# Patient Record
Sex: FEMALE | Race: OTHER
[De-identification: names, ages, dates, MRNs, and addresses within clinical notes are randomized per-mention and may not be internally consistent; named-entity substitution may affect disease eponyms.]

---

## 2021-09-28 ENCOUNTER — RESULT REVIEW (OUTPATIENT)
Age: 71
End: 2021-09-28

## 2022-06-22 PROBLEM — Z00.00 ENCOUNTER FOR PREVENTIVE HEALTH EXAMINATION: Status: ACTIVE | Noted: 2022-06-22

## 2022-06-23 ENCOUNTER — APPOINTMENT (OUTPATIENT)
Dept: ORTHOPEDIC SURGERY | Facility: CLINIC | Age: 72
End: 2022-06-23

## 2022-06-30 ENCOUNTER — APPOINTMENT (OUTPATIENT)
Dept: ORTHOPEDIC SURGERY | Facility: CLINIC | Age: 72
End: 2022-06-30

## 2022-06-30 DIAGNOSIS — Z86.39 PERSONAL HISTORY OF OTHER ENDOCRINE, NUTRITIONAL AND METABOLIC DISEASE: ICD-10-CM

## 2022-06-30 DIAGNOSIS — Z86.79 PERSONAL HISTORY OF OTHER DISEASES OF THE CIRCULATORY SYSTEM: ICD-10-CM

## 2022-10-13 ENCOUNTER — APPOINTMENT (OUTPATIENT)
Dept: ORTHOPEDIC SURGERY | Facility: CLINIC | Age: 72
End: 2022-10-13

## 2022-10-13 PROCEDURE — J3490M: CUSTOM

## 2022-10-13 PROCEDURE — 99214 OFFICE O/P EST MOD 30 MIN: CPT | Mod: 25

## 2022-10-13 PROCEDURE — 73564 X-RAY EXAM KNEE 4 OR MORE: CPT | Mod: 50

## 2022-10-13 PROCEDURE — 20610 DRAIN/INJ JOINT/BURSA W/O US: CPT | Mod: 50

## 2022-10-13 NOTE — HISTORY OF PRESENT ILLNESS
[de-identified] : 72F(retired)Bilateral knee pain since 2014 when pt. suffered a fall R>L. pain located at the anterior aspect of bilateral\par knees with associated stiffness and swelling. worse with prolonged standing, better at rest. has tried icing, moist heat\par and HEP, Advil and Tylenol with some relief.\par \par 10/13/22- Had CSI (12/2), no improvement

## 2022-10-13 NOTE — ASSESSMENT
[FreeTextEntry1] : Bilateral  X-Ray Examination of the KNEE (4 views): severe medial and patellofemoral degenerate changes.\par \par - We discussed their diagnosis and treatment options at length including the risks and benefits of both surgical and non-surgical options.\par - We will continue conservative treatment with activity modification, PT, icing, weight loss, and anti-inflammatory medications.\par - The patient was provided with a PT prescription to work on ROM, hip ER/abductors strengthening, quad/hamstring stretches and strengthening, and other exercises \par - The patient was advised to let pain guide the gradual advancement of activities. \par - We also discussed the possible of a corticosteroid injection in order to help decrease inflammation and pain so that they can perform better therapy.\par - The risks, benefits, and alternatives to corticosteroid injection were reviewed with the patient and they wished to proceed with this treatment course. \par - Follow up as needed in 6 weeks to re-evaluate, if no improvement we spoke about possibility of viscosupplementation injections\par \par (may eventually need TKA)\par

## 2023-03-30 ENCOUNTER — APPOINTMENT (OUTPATIENT)
Dept: ORTHOPEDIC SURGERY | Facility: CLINIC | Age: 73
End: 2023-03-30
Payer: MEDICARE

## 2023-03-30 VITALS — WEIGHT: 162 LBS | BODY MASS INDEX: 24.55 KG/M2 | HEIGHT: 68 IN

## 2023-03-30 PROCEDURE — 20610 DRAIN/INJ JOINT/BURSA W/O US: CPT | Mod: 50

## 2023-03-30 PROCEDURE — 99214 OFFICE O/P EST MOD 30 MIN: CPT | Mod: 25

## 2023-03-30 PROCEDURE — J3490M: CUSTOM | Mod: NC

## 2023-03-30 NOTE — HISTORY OF PRESENT ILLNESS
[de-identified] : 72F (retired)Bilateral knee pain since 2014 when pt. suffered a fall R>L. pain located at the anterior aspect of bilateral knees with associated stiffness and swelling. worse with prolonged standing, better at rest. has tried icing, moist heat and HEP, Advil and Tylenol with some relief.\par \par 10/13/22- Had CSI (12/2), no improvement \par 3/30/23- had CSI (10/13) with relief. Doing HEP pain returns\par

## 2023-03-30 NOTE — ASSESSMENT
[FreeTextEntry1] :  severe medial and patellofemoral degenerate changes.\par \par - We discussed their diagnosis and treatment options at length including the risks and benefits of both surgical and non-surgical options.\par - We will continue conservative treatment with activity modification, PT, icing, weight loss, and anti-inflammatory medications.\par - The patient was provided with a PT prescription to work on ROM, hip ER/abductors strengthening, quad/hamstring stretches and strengthening, and other exercises \par - The patient was advised to let pain guide the gradual advancement of activities. \par - We also discussed the possible of a corticosteroid injection in order to help decrease inflammation and pain so that they can perform better therapy.\par - The risks, benefits, and alternatives to corticosteroid injection were reviewed with the patient and they wished to proceed with this treatment course. \par - Follow up as needed in 6 weeks to re-evaluate, if no improvement we spoke about possibility of viscosupplementation injections\par \par (may eventually need TKA)\par

## 2023-03-30 NOTE — IMAGING
[de-identified] : \par LEFT KNEE\par Inspection:  mild effusion\par Palpation: medial joint line tenderness, anterior tenderness\par Knee Range of Motion:  3-125 \par Strength: 5/5 Quadriceps strength, 5/5 Hamstring strength\par Neurological: light touch is intact throughout\par Ligament Stability and Special Tests: \par McMurrays: neg\par Lachman: neg\par Pivot Shift: neg\par Posterior Drawer: neg\par Valgus: neg\par Varus: neg\par Patella Apprehension: neg\par Patella Maltracking: neg\par \par \par RIGHT KNEE\par Inspection:  mild effusion\par Palpation: medial joint line tenderness, anterior tenderness\par Knee Range of Motion:  3-125 \par Strength: 5/5 Quadriceps strength, 5/5 Hamstring strength\par Neurological: light touch is intact throughout\par Ligament Stability and Special Tests: \par McMurrays: neg\par Lachman: neg\par Pivot Shift: neg\par Posterior Drawer: neg\par Valgus: neg\par Varus: neg\par Patella Apprehension: neg\par Patella Maltracking: neg\par \par

## 2023-06-08 ENCOUNTER — APPOINTMENT (OUTPATIENT)
Dept: ORTHOPEDIC SURGERY | Facility: CLINIC | Age: 73
End: 2023-06-08
Payer: MEDICARE

## 2023-06-08 VITALS — HEIGHT: 67.5 IN | WEIGHT: 163 LBS | BODY MASS INDEX: 25.29 KG/M2

## 2023-06-08 PROCEDURE — 20610 DRAIN/INJ JOINT/BURSA W/O US: CPT | Mod: RT

## 2023-06-08 PROCEDURE — 99214 OFFICE O/P EST MOD 30 MIN: CPT | Mod: 25

## 2023-06-08 RX ORDER — METHYLPREDNISOLONE 4 MG/1
4 TABLET ORAL
Qty: 1 | Refills: 0 | Status: ACTIVE | COMMUNITY
Start: 2023-06-08 | End: 1900-01-01

## 2023-06-08 NOTE — PROCEDURE
[FreeTextEntry3] : Aspiration Procedure Note:\par \par The risks, benefits, and alternatives to aspiration were reviewed with the patient.  Risks outlined include but are not limited to infection, sepsis, bleeding, temporary increase in pain, syncopal episode, failure to resolve symptoms, and symptoms recurrence. Patient understood the risks and asked to proceed with this treatment course.\par \par Patient Identification\par Name/: Verbal with patient and/or family\par \par Procedure Verification:\par Procedure confirmed with patient or family/designee\par Consent for procedure: Verbal Consent Given\par Relevant documentation completed, reviewed, and signed\par Clinical indications for procedure confirmed\par \par Time-out with all members of procedure team immediately prior to procedure:\par Correct patient identified. Agreement on procedure. Correct side and site.\par \par KNEE ASPIRATION - RIGHT\par After verbal consent and identification of the correct patient and correct site, the superolateral right knee was prepped using alcohol swabs and betadine. This was allowed time to air dry. \par 64 cc of yellow fluid was aspirated from the knee using a sterile 18G needle after ethyl chloride spray for skin anesthesia. The patient tolerated the procedure well. After-care instructions were provided and included instructions to ice the area and to call if redness, pain, or fever develop.\par

## 2023-06-08 NOTE — ASSESSMENT
[FreeTextEntry1] :  severe medial and patellofemoral degenerate changes.\par \par - We discussed their diagnosis and treatment options at length including the risks and benefits of both surgical and non-surgical options.\par - We will continue conservative treatment with activity modification, PT, icing, weight loss, and anti-inflammatory medications.\par - The patient was provided with a PT prescription to work on ROM, hip ER/abductors strengthening, quad/hamstring stretches and strengthening, and other exercises \par - The patient was advised to let pain guide the gradual advancement of activities. \par - MDP rx\par - Discussed the possible side effects of medication along with the timing and frequency for taking.\par - Right knee asp today - cooper well\par - Follow up as needed in 6 weeks to re-evaluate, if no improvement we spoke about possibility of viscosupplementation injections\par \par (may eventually need TKA)\par

## 2023-06-08 NOTE — HISTORY OF PRESENT ILLNESS
[de-identified] : 73F (retired)Bilateral knee pain since 2014 when pt. suffered a fall R>L. pain located at the anterior aspect of bilateral knees with associated stiffness and swelling. worse with prolonged standing, better at rest. has tried icing, moist heat and HEP, Advil and Tylenol, advil with some relief.\par \par 10/13/22 - Had CSI (12/2), no improvement \par 3/30/23 - had CSI (10/13) with relief. Doing HEP pain returns\par 6/8/23 - had CSI (3/30) with relief in both knees but now right worsening since twisted while shopping [email or web address]

## 2023-06-08 NOTE — IMAGING
[de-identified] : \par RIGHT KNEE\par Inspection:  mild effusion, pop cyst\par Palpation: medial joint line tenderness, anterior tenderness\par Knee Range of Motion:  3-125 \par Strength: 5/5 Quadriceps strength, 5/5 Hamstring strength\par Neurological: light touch is intact throughout\par Ligament Stability and Special Tests: \par McMurrays: neg\par Lachman: neg\par Pivot Shift: neg\par Posterior Drawer: neg\par Valgus: neg\par Varus: neg\par Patella Apprehension: neg\par Patella Maltracking: neg\par \par \par LEFT KNEE\par Inspection:  mild effusion\par Palpation: medial joint line tenderness, anterior tenderness\par Knee Range of Motion:  3-125 \par Strength: 5/5 Quadriceps strength, 5/5 Hamstring strength\par Neurological: light touch is intact throughout\par Ligament Stability and Special Tests: \par McMurrays: neg\par Lachman: neg\par Pivot Shift: neg\par Posterior Drawer: neg\par Valgus: neg\par Varus: neg\par Patella Apprehension: neg\par Patella Maltracking: neg\par \par \par \par

## 2023-09-28 ENCOUNTER — APPOINTMENT (OUTPATIENT)
Dept: ORTHOPEDIC SURGERY | Facility: CLINIC | Age: 73
End: 2023-09-28
Payer: MEDICARE

## 2023-09-28 VITALS — HEIGHT: 67.5 IN | WEIGHT: 163 LBS | BODY MASS INDEX: 25.29 KG/M2

## 2023-09-28 PROCEDURE — 20610 DRAIN/INJ JOINT/BURSA W/O US: CPT | Mod: 50

## 2023-09-28 PROCEDURE — J3490M: CUSTOM | Mod: NC

## 2023-09-28 PROCEDURE — 99214 OFFICE O/P EST MOD 30 MIN: CPT | Mod: 25

## 2023-10-12 ENCOUNTER — APPOINTMENT (OUTPATIENT)
Dept: ORTHOPEDIC SURGERY | Facility: CLINIC | Age: 73
End: 2023-10-12
Payer: MEDICARE

## 2023-10-12 PROCEDURE — 99214 OFFICE O/P EST MOD 30 MIN: CPT | Mod: 25

## 2023-10-12 PROCEDURE — 20610 DRAIN/INJ JOINT/BURSA W/O US: CPT | Mod: 50

## 2023-12-21 ENCOUNTER — APPOINTMENT (OUTPATIENT)
Dept: ORTHOPEDIC SURGERY | Facility: CLINIC | Age: 73
End: 2023-12-21
Payer: MEDICARE

## 2023-12-21 VITALS — BODY MASS INDEX: 25.29 KG/M2 | HEIGHT: 67.5 IN | WEIGHT: 163 LBS

## 2023-12-21 PROCEDURE — J3490M: CUSTOM | Mod: NC

## 2023-12-21 PROCEDURE — 20610 DRAIN/INJ JOINT/BURSA W/O US: CPT | Mod: 50

## 2023-12-21 PROCEDURE — 99214 OFFICE O/P EST MOD 30 MIN: CPT | Mod: 25

## 2023-12-21 NOTE — HISTORY OF PRESENT ILLNESS
[de-identified] : 73F (retired) Bilateral knee pain since 2014 when pt. suffered a fall R>L. pain located at the anterior aspect of bilateral knees with associated stiffness and swelling. worse with prolonged standing, better at rest. has tried icing, moist heat and HEP, Advil and Tylenol, advil with some relief.  10/13/22 - Had CSI (12/2), no improvement  3/30/23 - had CSI (10/13) with relief. Doing HEP pain returns 6/8/23 - had CSI (3/30) with relief in both knees but now right worsening since twisted while shopping yesterday  9/28/23- had asp and sent MDP with some relief and then swelling returned  10/12/23- had CSI (9/28) with some relief, still achiness in knees 12/21/23- gel one (10/12) with only temp relief

## 2023-12-21 NOTE — ASSESSMENT
[FreeTextEntry1] : severe medial and patellofemoral degenerate changes.   - We discussed their diagnosis and treatment options at length including the risks and benefits of both surgical treatment with a knee replacement and non-surgical options. - We will continue conservative treatment with activity modification, PT, icing, weight loss, and anti-inflammatory medications. - The patient was provided with a PT prescription to work on ROM, hip ER/abductors strengthening, quad/hamstring stretches and strengthening, and other exercises  - The patient was advised to let pain guide the gradual advancement of activities.  - We also discussed the possible of a corticosteroid injection in order to help decrease inflammation and pain so that they can perform better therapy. - The risks, benefits, and alternatives to corticosteroid injection were reviewed with the patient and they wished to proceed with this treatment course.  - Follow up as needed in 6 weeks to re-evaluate, if no improvement we spoke about possibility of viscosupplementation injections   (may eventually need TKA).

## 2023-12-21 NOTE — IMAGING
[de-identified] : \par  RIGHT KNEE\par  Inspection:  mild effusion, pop cyst\par  Palpation: medial joint line tenderness, anterior tenderness\par  Knee Range of Motion:  3-125 \par  Strength: 5/5 Quadriceps strength, 5/5 Hamstring strength\par  Neurological: light touch is intact throughout\par  Ligament Stability and Special Tests: \par  McMurrays: neg\par  Lachman: neg\par  Pivot Shift: neg\par  Posterior Drawer: neg\par  Valgus: neg\par  Varus: neg\par  Patella Apprehension: neg\par  Patella Maltracking: neg\par  \par  \par  LEFT KNEE\par  Inspection:  mild effusion\par  Palpation: medial joint line tenderness, anterior tenderness\par  Knee Range of Motion:  3-125 \par  Strength: 5/5 Quadriceps strength, 5/5 Hamstring strength\par  Neurological: light touch is intact throughout\par  Ligament Stability and Special Tests: \par  McMurrays: neg\par  Lachman: neg\par  Pivot Shift: neg\par  Posterior Drawer: neg\par  Valgus: neg\par  Varus: neg\par  Patella Apprehension: neg\par  Patella Maltracking: neg\par  \par  \par  \par

## 2024-04-22 ENCOUNTER — APPOINTMENT (OUTPATIENT)
Dept: ORTHOPEDIC SURGERY | Facility: CLINIC | Age: 74
End: 2024-04-22
Payer: MEDICARE

## 2024-04-22 VITALS — WEIGHT: 153 LBS | BODY MASS INDEX: 24.01 KG/M2 | HEIGHT: 67 IN

## 2024-04-22 DIAGNOSIS — M17.12 UNILATERAL PRIMARY OSTEOARTHRITIS, LEFT KNEE: ICD-10-CM

## 2024-04-22 DIAGNOSIS — M17.11 UNILATERAL PRIMARY OSTEOARTHRITIS, RIGHT KNEE: ICD-10-CM

## 2024-04-22 PROCEDURE — 99214 OFFICE O/P EST MOD 30 MIN: CPT | Mod: 25

## 2024-04-22 PROCEDURE — 73564 X-RAY EXAM KNEE 4 OR MORE: CPT | Mod: 50

## 2024-04-22 PROCEDURE — J3490M: CUSTOM | Mod: NC

## 2024-04-22 PROCEDURE — 20610 DRAIN/INJ JOINT/BURSA W/O US: CPT | Mod: 50

## 2024-04-22 NOTE — ASSESSMENT
[FreeTextEntry1] : Bilateral X-Ray Examination of the KNEE (4 views): severe medial and patellofemoral degenerate changes.   - We discussed their diagnosis and treatment options at length including the risks and benefits of both surgical treatment with a knee replacement and non-surgical options. - We will continue conservative treatment with activity modification, PT, icing, weight loss, and anti-inflammatory medications. - The patient was provided with a PT prescription to work on ROM, hip ER/abductors strengthening, quad/hamstring stretches and strengthening, and other exercises  - The patient was advised to let pain guide the gradual advancement of activities.  - We also discussed the possible of a corticosteroid injection in order to help decrease inflammation and pain so that they can perform better therapy. - The risks, benefits, and alternatives to corticosteroid injection were reviewed with the patient and they wished to proceed with this treatment course.  - Follow up as needed in 6 weeks to re-evaluate, if no improvement we spoke about possibility of viscosupplementation injections   (may eventually need TKA).

## 2024-04-22 NOTE — IMAGING
[de-identified] : \par  RIGHT KNEE\par  Inspection:  mild effusion, pop cyst\par  Palpation: medial joint line tenderness, anterior tenderness\par  Knee Range of Motion:  3-125 \par  Strength: 5/5 Quadriceps strength, 5/5 Hamstring strength\par  Neurological: light touch is intact throughout\par  Ligament Stability and Special Tests: \par  McMurrays: neg\par  Lachman: neg\par  Pivot Shift: neg\par  Posterior Drawer: neg\par  Valgus: neg\par  Varus: neg\par  Patella Apprehension: neg\par  Patella Maltracking: neg\par  \par  \par  LEFT KNEE\par  Inspection:  mild effusion\par  Palpation: medial joint line tenderness, anterior tenderness\par  Knee Range of Motion:  3-125 \par  Strength: 5/5 Quadriceps strength, 5/5 Hamstring strength\par  Neurological: light touch is intact throughout\par  Ligament Stability and Special Tests: \par  McMurrays: neg\par  Lachman: neg\par  Pivot Shift: neg\par  Posterior Drawer: neg\par  Valgus: neg\par  Varus: neg\par  Patella Apprehension: neg\par  Patella Maltracking: neg\par  \par  \par  \par

## 2024-04-22 NOTE — HISTORY OF PRESENT ILLNESS
[de-identified] : 74F (retired) Bilateral knee pain since 2014 when pt. suffered a fall R>L. pain located at the anterior aspect of bilateral knees with associated stiffness and swelling. worse with prolonged standing, better at rest. has tried icing, moist heat and HEP, Advil and Tylenol, advil with some relief.  10/13/22 - Had CSI (12/2), no improvement  3/30/23 - had CSI (10/13) with relief. Doing HEP pain returns 6/8/23 - had CSI (3/30) with relief in both knees but now right worsening since twisted while shopping yesterday  9/28/23- had asp and sent MDP with some relief and then swelling returned  10/12/23- had CSI (9/28) with some relief, still achiness in knees 12/21/23- gel one (10/12) with only temp relief 4/22/24- had CSI( 12/21)  with good temp relief, now paiin in b/l knee worsenign since april 2024

## 2024-05-08 ENCOUNTER — APPOINTMENT (OUTPATIENT)
Dept: PAIN MANAGEMENT | Facility: CLINIC | Age: 74
End: 2024-05-08

## 2024-09-12 ENCOUNTER — APPOINTMENT (OUTPATIENT)
Dept: ORTHOPEDIC SURGERY | Facility: CLINIC | Age: 74
End: 2024-09-12
Payer: MEDICARE

## 2024-09-12 DIAGNOSIS — M17.11 UNILATERAL PRIMARY OSTEOARTHRITIS, RIGHT KNEE: ICD-10-CM

## 2024-09-12 DIAGNOSIS — M17.12 UNILATERAL PRIMARY OSTEOARTHRITIS, LEFT KNEE: ICD-10-CM

## 2024-09-12 PROCEDURE — 99214 OFFICE O/P EST MOD 30 MIN: CPT | Mod: 25

## 2024-09-12 PROCEDURE — 20610 DRAIN/INJ JOINT/BURSA W/O US: CPT | Mod: 50

## 2024-09-12 PROCEDURE — J3490M: CUSTOM | Mod: NC,JZ

## 2024-09-12 NOTE — IMAGING
[de-identified] : \par  RIGHT KNEE\par  Inspection:  mild effusion, pop cyst\par  Palpation: medial joint line tenderness, anterior tenderness\par  Knee Range of Motion:  3-125 \par  Strength: 5/5 Quadriceps strength, 5/5 Hamstring strength\par  Neurological: light touch is intact throughout\par  Ligament Stability and Special Tests: \par  McMurrays: neg\par  Lachman: neg\par  Pivot Shift: neg\par  Posterior Drawer: neg\par  Valgus: neg\par  Varus: neg\par  Patella Apprehension: neg\par  Patella Maltracking: neg\par  \par  \par  LEFT KNEE\par  Inspection:  mild effusion\par  Palpation: medial joint line tenderness, anterior tenderness\par  Knee Range of Motion:  3-125 \par  Strength: 5/5 Quadriceps strength, 5/5 Hamstring strength\par  Neurological: light touch is intact throughout\par  Ligament Stability and Special Tests: \par  McMurrays: neg\par  Lachman: neg\par  Pivot Shift: neg\par  Posterior Drawer: neg\par  Valgus: neg\par  Varus: neg\par  Patella Apprehension: neg\par  Patella Maltracking: neg\par  \par  \par  \par

## 2024-09-12 NOTE — ASSESSMENT
[FreeTextEntry1] : severe medial and patellofemoral degenerate changes.   - We discussed their diagnosis and treatment options at length including the risks and benefits of both surgical treatment with a knee replacement and non-surgical options. - We will continue conservative treatment with activity modification, PT, icing, weight loss, and anti-inflammatory medications. - The patient was provided with a PT prescription to work on ROM, hip ER/abductors strengthening, quad/hamstring stretches and strengthening, and other exercises  - The patient was advised to let pain guide the gradual advancement of activities.  - We also discussed the possible of a corticosteroid injection in order to help decrease inflammation and pain so that they can perform better therapy. - The risks, benefits, and alternatives to corticosteroid injection were reviewed with the patient and they wished to proceed with this treatment course.  - Follow up as needed in 6 weeks to re-evaluate, if no improvement we spoke about possibility of viscosupplementation injections   (may eventually need TKA).    Medication Discussion: 1) We discussed a comprehensive treatment plan that included possible pharmaceutical management involving the use of prescription strength medications including but not limited to options such as oral Naprosyn 500mg BID, once daily Meloxicam 15 mg, or 500-650 mg Tylenol versus over the counter oral medications in addition to discussing possible topical prescription Pennsaid vs  Voltaren gel. 2) There is a moderate risk of morbidity with further treatment, especially from use of prescription strength medications and possible side effects of these medications which include but are not limited to upset stomach with oral medications, skin reactions to topical medications and GI/cardiac/renal issues with long term use. 3) I recommended that the patient follow-up with their medical physician if there are any significant potential issues with long term medication use such as interactions with current medications or with exacerbation of underlying medical comorbidities. 4) The benefits and risks associated with use of oral and / or topical prescription and over the counter anti-inflammatory medications were discussed with the patient. The patient voiced understanding of the risks including but not limited to bleeding, stroke, kidney dysfunction, heart disease, and were referred to the black box warning label for further information.

## 2024-09-12 NOTE — HISTORY OF PRESENT ILLNESS
[de-identified] : 74F (retired) Bilateral knee pain since 2014 when pt. suffered a fall R>L. pain located at the anterior aspect of bilateral knees with associated stiffness and swelling. worse with prolonged standing, better at rest. has tried icing, moist heat and HEP, Advil and Tylenol, advil with some relief.  10/13/22 - Had CSI (12/2), no improvement  3/30/23 - had CSI (10/13) with relief. Doing HEP pain returns 6/8/23 - had CSI (3/30) with relief in both knees but now right worsening since twisted while shopping yesterday  9/28/23- had asp and sent MDP with some relief and then swelling returned  10/12/23- had CSI (9/28) with some relief, still achiness in knees 12/21/23- gel one (10/12) with only temp relief 4/22/24- had CSI( 12/21)  with good temp relief, now paiin in b/l knee worsenign since april 2024 9/12/24- had b/l CSI (4/22) with temp relief. Pain returns since sept 2024

## 2024-10-14 ENCOUNTER — APPOINTMENT (OUTPATIENT)
Dept: ORTHOPEDIC SURGERY | Facility: CLINIC | Age: 74
End: 2024-10-14
Payer: MEDICARE

## 2024-10-14 PROCEDURE — 20610 DRAIN/INJ JOINT/BURSA W/O US: CPT | Mod: 50

## 2024-10-14 PROCEDURE — 99214 OFFICE O/P EST MOD 30 MIN: CPT | Mod: 25

## 2024-10-21 ENCOUNTER — APPOINTMENT (OUTPATIENT)
Dept: ORTHOPEDIC SURGERY | Facility: CLINIC | Age: 74
End: 2024-10-21
Payer: MEDICARE

## 2024-10-21 PROCEDURE — 99024 POSTOP FOLLOW-UP VISIT: CPT

## 2024-10-21 PROCEDURE — 20610 DRAIN/INJ JOINT/BURSA W/O US: CPT | Mod: 50

## 2024-10-28 ENCOUNTER — APPOINTMENT (OUTPATIENT)
Dept: ORTHOPEDIC SURGERY | Facility: CLINIC | Age: 74
End: 2024-10-28
Payer: MEDICARE

## 2024-10-28 DIAGNOSIS — M17.12 UNILATERAL PRIMARY OSTEOARTHRITIS, LEFT KNEE: ICD-10-CM

## 2024-10-28 DIAGNOSIS — M17.11 UNILATERAL PRIMARY OSTEOARTHRITIS, RIGHT KNEE: ICD-10-CM

## 2024-10-28 PROCEDURE — 99024 POSTOP FOLLOW-UP VISIT: CPT

## 2024-10-28 PROCEDURE — 20610 DRAIN/INJ JOINT/BURSA W/O US: CPT | Mod: 50

## 2025-04-28 ENCOUNTER — APPOINTMENT (OUTPATIENT)
Dept: ORTHOPEDIC SURGERY | Facility: CLINIC | Age: 75
End: 2025-04-28
Payer: MEDICARE

## 2025-04-28 VITALS — BODY MASS INDEX: 24.01 KG/M2 | HEIGHT: 67 IN | WEIGHT: 153 LBS

## 2025-04-28 PROCEDURE — 20610 DRAIN/INJ JOINT/BURSA W/O US: CPT | Mod: 50

## 2025-04-28 PROCEDURE — J3490M: CUSTOM | Mod: NC,JZ

## 2025-04-28 PROCEDURE — 99214 OFFICE O/P EST MOD 30 MIN: CPT | Mod: 25

## 2025-04-28 PROCEDURE — 73564 X-RAY EXAM KNEE 4 OR MORE: CPT | Mod: 50

## 2025-05-12 ENCOUNTER — APPOINTMENT (OUTPATIENT)
Dept: ORTHOPEDIC SURGERY | Facility: CLINIC | Age: 75
End: 2025-05-12
Payer: MEDICARE

## 2025-05-12 PROCEDURE — 20610 DRAIN/INJ JOINT/BURSA W/O US: CPT | Mod: 50

## 2025-05-12 PROCEDURE — 99214 OFFICE O/P EST MOD 30 MIN: CPT | Mod: 25

## 2025-05-19 ENCOUNTER — APPOINTMENT (OUTPATIENT)
Dept: ORTHOPEDIC SURGERY | Facility: CLINIC | Age: 75
End: 2025-05-19
Payer: MEDICARE

## 2025-05-19 PROCEDURE — 20610 DRAIN/INJ JOINT/BURSA W/O US: CPT | Mod: 50

## 2025-06-02 ENCOUNTER — APPOINTMENT (OUTPATIENT)
Dept: ORTHOPEDIC SURGERY | Facility: CLINIC | Age: 75
End: 2025-06-02
Payer: MEDICARE

## 2025-06-02 DIAGNOSIS — M17.11 UNILATERAL PRIMARY OSTEOARTHRITIS, RIGHT KNEE: ICD-10-CM

## 2025-06-02 DIAGNOSIS — M17.12 UNILATERAL PRIMARY OSTEOARTHRITIS, LEFT KNEE: ICD-10-CM

## 2025-06-02 PROCEDURE — 20610 DRAIN/INJ JOINT/BURSA W/O US: CPT | Mod: 50

## 2025-06-02 PROCEDURE — 99213 OFFICE O/P EST LOW 20 MIN: CPT | Mod: 25

## 2025-09-11 ENCOUNTER — APPOINTMENT (OUTPATIENT)
Dept: ORTHOPEDIC SURGERY | Facility: CLINIC | Age: 75
End: 2025-09-11
Payer: MEDICARE

## 2025-09-11 DIAGNOSIS — M17.11 UNILATERAL PRIMARY OSTEOARTHRITIS, RIGHT KNEE: ICD-10-CM

## 2025-09-11 DIAGNOSIS — M17.12 UNILATERAL PRIMARY OSTEOARTHRITIS, LEFT KNEE: ICD-10-CM

## 2025-09-11 PROCEDURE — 99214 OFFICE O/P EST MOD 30 MIN: CPT | Mod: 25

## 2025-09-11 PROCEDURE — J3490M: CUSTOM | Mod: NC

## 2025-09-11 PROCEDURE — 20610 DRAIN/INJ JOINT/BURSA W/O US: CPT | Mod: 50
